# Patient Record
Sex: FEMALE | URBAN - METROPOLITAN AREA
[De-identification: names, ages, dates, MRNs, and addresses within clinical notes are randomized per-mention and may not be internally consistent; named-entity substitution may affect disease eponyms.]

---

## 2021-02-08 ENCOUNTER — OFFICE VISIT (OUTPATIENT)
Dept: URBAN - METROPOLITAN AREA CLINIC 41 | Facility: CLINIC | Age: 66
End: 2021-02-08
Payer: MEDICARE

## 2021-02-08 DIAGNOSIS — H53.022 REFRACTIVE AMBLYOPIA, LEFT EYE: ICD-10-CM

## 2021-02-08 DIAGNOSIS — H35.372 PUCKERING OF MACULA, LEFT EYE: ICD-10-CM

## 2021-02-08 DIAGNOSIS — H43.811 VITREOUS DEGENERATION, RIGHT EYE: ICD-10-CM

## 2021-02-08 DIAGNOSIS — H25.11 AGE-RELATED NUCLEAR CATARACT, RIGHT EYE: ICD-10-CM

## 2021-02-08 DIAGNOSIS — Z96.1 PRESENCE OF INTRAOCULAR LENS: ICD-10-CM

## 2021-02-08 PROCEDURE — 92014 COMPRE OPH EXAM EST PT 1/>: CPT | Performed by: OPHTHALMOLOGY

## 2021-02-08 PROCEDURE — 92134 CPTRZ OPH DX IMG PST SGM RTA: CPT | Performed by: OPHTHALMOLOGY

## 2021-02-08 ASSESSMENT — INTRAOCULAR PRESSURE
OS: 9
OD: 12

## 2021-02-08 NOTE — IMPRESSION/PLAN
Impression: Presence of intraocular lens: Z96.1. Plan: Lens in good place. 1+ PCO is present. Followed by Dr. Tari Ovalles. Observe.

## 2021-02-08 NOTE — IMPRESSION/PLAN
Impression: Puckering of macula, bilateral: H35.373. OD: new ERM 2/8/21 OS: 23g PPV/MP/ICG/ILM peel/IVK x ERM, OS (06/05/18) Plan: OD: Exam and OCT demonstrates an ERM with distortion of the foveal contour. The diagnosis, natural history, and prognosis of ERMs, as well as the risks and benefits of PPV/MP versus observation were discussed at length. Given the patient's current visual acuity and hindrance on activities of daily living, surgical correction was recommended. Discussed that while the distortion should yue, the final acuity, which can take months to achieve, may or may not be an improvement over baseline. Surgery 23g PPV/MP/ICG/ILM peel/ IVK x ERM OD

OS: ERM is peeled; thickening and vision continue to improve. Observe OS for now.

## 2021-02-08 NOTE — IMPRESSION/PLAN
Impression: Age-related nuclear cataract, right eye: H25.11.  Plan: Discussed worsening after PPV;  followed by Dr. Yannick Larson

## 2021-02-08 NOTE — IMPRESSION/PLAN
Impression: Vitreous degeneration, right eye: H43.811. OD. Status: Symptomatic. Plan: Patient notes floaters have improved. Exam demonstrates a PVD without any RD or RT on exam.  Discussed R/B/A of PPV vs observation for the floaters. The floaters are not debilitating and so observation was recommended. Reassurance provided. RDW discussed. Precautions discussed with the patient.

## 2021-03-08 ENCOUNTER — Encounter (OUTPATIENT)
Dept: URBAN - METROPOLITAN AREA EXTERNAL CLINIC 14 | Facility: EXTERNAL CLINIC | Age: 66
End: 2021-03-08
Payer: MEDICARE

## 2021-03-08 PROCEDURE — 67042 VIT FOR MACULAR HOLE: CPT | Performed by: OPHTHALMOLOGY

## 2021-03-09 ENCOUNTER — POST-OPERATIVE VISIT (OUTPATIENT)
Dept: URBAN - METROPOLITAN AREA CLINIC 41 | Facility: CLINIC | Age: 66
End: 2021-03-09
Payer: MEDICARE

## 2021-03-09 PROCEDURE — 99024 POSTOP FOLLOW-UP VISIT: CPT | Performed by: OPHTHALMOLOGY

## 2021-03-09 ASSESSMENT — INTRAOCULAR PRESSURE
OD: 19
OS: 15

## 2021-03-09 NOTE — IMPRESSION/PLAN
Impression: S/P 23g PPV/MP/ICG/ILM peel/ IVK OD - 1 Day. Puckering of macula, bilateral  H35.373. Plan: Doing well. Start Rx. 

Return in 1 week (SI)

## 2021-03-15 ENCOUNTER — POST-OPERATIVE VISIT (OUTPATIENT)
Dept: URBAN - METROPOLITAN AREA CLINIC 27 | Facility: CLINIC | Age: 66
End: 2021-03-15
Payer: MEDICARE

## 2021-03-15 PROCEDURE — 99024 POSTOP FOLLOW-UP VISIT: CPT | Performed by: OPHTHALMOLOGY

## 2021-03-15 ASSESSMENT — INTRAOCULAR PRESSURE
OD: 18
OS: 18

## 2021-03-15 NOTE — IMPRESSION/PLAN
Impression: S/P 23g PPV/MP/ICG/ILM peel/ IVK OD - 7 Days. Puckering of macula, bilateral  H35.373. Plan: Doing well, mild post-op VH which should clear.   Taper Rx.

1 month with SI

## 2021-04-12 ENCOUNTER — POST-OPERATIVE VISIT (OUTPATIENT)
Dept: URBAN - METROPOLITAN AREA CLINIC 41 | Facility: CLINIC | Age: 66
End: 2021-04-12
Payer: MEDICARE

## 2021-04-12 DIAGNOSIS — H35.373 PUCKERING OF MACULA, BILATERAL: Primary | ICD-10-CM

## 2021-04-12 PROCEDURE — 99024 POSTOP FOLLOW-UP VISIT: CPT | Performed by: OPHTHALMOLOGY

## 2021-04-12 ASSESSMENT — INTRAOCULAR PRESSURE
OS: 18
OD: 18

## 2021-04-12 NOTE — IMPRESSION/PLAN
Impression: S/P 23g PPV/MP/ICG/ILM peel/ IVK OD - 35 Days. Puckering of macula, bilateral  H35.373. Excellent post op course. Post operative instructions reviewed. Condition is improving.  Plan: RTC: 2 mo POS with OCT OU

## 2021-06-07 ENCOUNTER — OFFICE VISIT (OUTPATIENT)
Dept: URBAN - METROPOLITAN AREA CLINIC 41 | Facility: CLINIC | Age: 66
End: 2021-06-07
Payer: MEDICARE

## 2021-06-07 PROCEDURE — 92014 COMPRE OPH EXAM EST PT 1/>: CPT | Performed by: OPHTHALMOLOGY

## 2021-06-07 PROCEDURE — 92134 CPTRZ OPH DX IMG PST SGM RTA: CPT | Performed by: OPHTHALMOLOGY

## 2021-06-07 ASSESSMENT — INTRAOCULAR PRESSURE
OS: 15
OD: 15

## 2021-06-07 NOTE — IMPRESSION/PLAN
Impression: Age-related nuclear cataract, right eye: H25.11. Plan: Discussed worsening after PPV;  followed by Dr. Erick Lechuga. Recommend follow up; patient is now cleared to proceed with eval as retina is stabilized.

## 2021-06-07 NOTE — IMPRESSION/PLAN
Impression: Puckering of macula, bilateral: H35.373. OD: s/p 23g PPV/MP/ICG/ILM PEEL/IVK (03/08/2021)-SI
OS: s/p 23g PPV/MP/ICG/ILM PEEL/IVK (06/05/2018) Plan: ERM is peeled; thickening and vision continue to improve. Observe OS for now. 

RTC 3-4 mo with OCT OU

## 2021-06-07 NOTE — IMPRESSION/PLAN
Impression: Presence of intraocular lens: Z96.1. Plan: Lens in good place. 1-2+ PCO is present. Followed by Dr. Jennifer Silva. Will recommend patient follow up with Dr. Jennifer Silva for YAG eval OS and eval for cataract OD.